# Patient Record
Sex: FEMALE | Race: WHITE
[De-identification: names, ages, dates, MRNs, and addresses within clinical notes are randomized per-mention and may not be internally consistent; named-entity substitution may affect disease eponyms.]

---

## 2018-10-23 ENCOUNTER — HOSPITAL ENCOUNTER (EMERGENCY)
Dept: HOSPITAL 74 - JERFT | Age: 53
Discharge: HOME | End: 2018-10-23
Payer: COMMERCIAL

## 2018-10-23 VITALS — DIASTOLIC BLOOD PRESSURE: 69 MMHG | TEMPERATURE: 99.8 F | HEART RATE: 81 BPM | SYSTOLIC BLOOD PRESSURE: 131 MMHG

## 2018-10-23 VITALS — BODY MASS INDEX: 41.1 KG/M2
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
## 2018-10-23 DIAGNOSIS — S92.351A: Primary | ICD-10-CM

## 2018-10-23 DIAGNOSIS — S93.491A: ICD-10-CM

## 2018-10-23 NOTE — PDOC
History of Present Illness





- General


Chief Complaint: Injury


Stated Complaint: INJURY,RT ANKLE


Time Seen by Provider: 10/23/18 14:14





- History of Present Illness


Initial Comments: 





10/23/18 14:22


53-year-old female with a past medical history significant for ADHD, she takes 

Wellbutrin and Prozac presents for evaluation of right ankle and foot pain 

after a fall at work earlier today while coming down some steps. She denies 

hitting her head. She describes an inversion type of injury to her right ankle 

and foot. No postinjury nausea vomiting or visual changes no other associated 

symptoms besides right ankle and foot pain.





Past History





- Past Medical History


Allergies/Adverse Reactions: 


 Allergies











Allergy/AdvReac Type Severity Reaction Status Date / Time


 


Penicillins Allergy   Verified 10/23/18 13:52











Home Medications: 


Ambulatory Orders





Acetaminophen W/ Codeine #3 [Tylenol # 3] 1 combo PO Q8H PRN #1 tablet 07/20/12 


Bupropion HCl [Wellbutrin -] 150 mg PO DAILY #1 tablet 07/20/12 


Fluoxetine [Fluoxetine HCl] 80 mg PO HS #1 powder 07/20/12 


Levothyroxine [Synthroid -] 125 mcg PO DAILY #1 tablet 07/20/12 








Anemia: No


Asthma: No


Cancer: No


Cardiac Disorders: No


CVA: No


COPD: No


CHF: No


Dementia: No


Diabetes: No


GI Disorders: No


 Disorders: No


HTN: No


Hypercholesterolemia: No


Liver Disease: No


Seizures: No


Thyroid Disease: Yes (HYPOTHYROIDISM)





- Surgical History


Abdominal Surgery: Yes (FIBROID REMOVAL)


Appendectomy: No


Cardiac Surgery: No


Cholecystectomy: No


Lung Surgery: No


Neurologic Surgery: No


Orthopedic Surgery: No





- Suicide/Smoking/Psychosocial Hx


Smoking History: Never smoked


Have you smoked in the past 12 months: No


Information on smoking cessation initiated: No


Hx Alcohol Use: No


Drug/Substance Use Hx: No


Substance Use Type: None


Hx Substance Use Treatment: No





**Review of Systems





- Review of Systems


Musculoskeletal: Yes: See HPI, Joint Pain


All Other Systems: Reviewed and Negative





*Physical Exam





- Vital Signs


 Last Vital Signs











Temp Pulse Resp BP Pulse Ox


 


 99.8 F H  81   16   131/69   100 


 


 10/23/18 13:50  10/23/18 13:50  10/23/18 13:50  10/23/18 13:50  10/23/18 13:50














- Physical Exam


Comments: 





10/23/18 14:28


Right ankle skin color and temperature are normal there is swelling about the 

lateral aspect of the ankle. Right foot is ecchymotic about the dorsal lateral 

aspect of the foot with swelling. There is no tenderness about the knee and 

proximal fibula or along its distal course. There is mild tenderness about the 

lateral malleolus and area of the ATFL there is tenderness about the base of 

the fifth metatarsal. No tenderness about the medial malleolus deltoid or 

navicular. Range of motion is limited she is unable to tolerate stability 

testing, there are no gross sensorimotor deficits buying calf are soft and 

nontender. He is neurovascularly intact





ED Treatment Course





- RADIOLOGY


Radiology Studies Ordered: 














 Category Date Time Status


 


 ANKLE & FOOT-RIGHT* [RAD] Stat Radiology  10/23/18 14:17 Ordered














Medical Decision Making





- Medical Decision Making





10/23/18 14:44


There is an old fracture at the shaft of the fifth metatarsal and an acute 

fracture at the styloid of the fifth metatarsal Thorpe wrap weight-bear as 

tolerated with use of crutches and hard sole shoe follow-up with orthopedics 

for the fracture and ankle sprain





*DC/Admit/Observation/Transfer


Diagnosis at time of Disposition: 


 Fracture, foot, Ankle sprain








- Discharge Dispostion


Disposition: HOME


Condition at time of disposition: Stable


Decision to Admit order: No





- Referrals


Referrals: 


Aniyah Amado MD [Primary Care Provider] - 


Charli Casas MD [Staff Physician] - 





- Patient Instructions


Printed Discharge Instructions:  Foot Fracture, DI for Foot Fracture, Ankle 

Sprain, DI for Ankle Sprain


Additional Instructions: 


He may weight-bear as tolerated with use of crutches and the hard sole shoe. 

Please Lorna Maurilio wrap in place until seen by orthopedic surgery return to the 

emergency room should symptoms worsen or go unresolved and follow-up with 

orthopedic surgery in 2-3 days for further evaluation and treatment options. He 

may take Tylenol and Motrin as directed for pain.





- Post Discharge Activity

## 2019-09-14 ENCOUNTER — HOSPITAL ENCOUNTER (EMERGENCY)
Dept: HOSPITAL 74 - JERFT | Age: 54
Discharge: HOME | End: 2019-09-14
Payer: COMMERCIAL

## 2019-09-14 VITALS — SYSTOLIC BLOOD PRESSURE: 148 MMHG | HEART RATE: 77 BPM | DIASTOLIC BLOOD PRESSURE: 75 MMHG | TEMPERATURE: 99 F

## 2019-09-14 VITALS — BODY MASS INDEX: 41.1 KG/M2

## 2019-09-14 DIAGNOSIS — H60.502: Primary | ICD-10-CM

## 2019-09-14 DIAGNOSIS — F90.9: ICD-10-CM

## 2019-09-14 DIAGNOSIS — F32.9: ICD-10-CM

## 2019-09-14 DIAGNOSIS — H66.92: ICD-10-CM

## 2019-09-14 DIAGNOSIS — E03.9: ICD-10-CM

## 2019-09-14 DIAGNOSIS — M16.0: ICD-10-CM

## 2019-09-14 NOTE — PDOC
History of Present Illness





- General


Chief Complaint: Ear Problem


Stated Complaint: HEARING LOSS TO LT EAR


Time Seen by Provider: 09/14/19 19:20


History Source: Patient


Exam Limitations: No Limitations





- History of Present Illness


Initial Comments: 





09/14/19 19:43





HISTORY OF PRESENT ILLNESS: This is a 54-year-old woman who presents emergency 

department for evaluation of left ear pain and decreased hearing over the past 

3 days. Patient reports when she lays with her left ear down the pain in clog 

sensation she is experiencing in the ear temporarily resolves and her hearing 

returned to normal. Patient noted when she woke up this morning there is some 

dried crusted mucus on her face which had originated from her left ear. She 

denies any earbud or headphone usage. Patient denies any recent swimming in the 

past 2 weeks.





No recent travel or sick contacts. 


PAST MEDICAL HISTORY: Denies past medical history


SURGICAL HISTORY: Denies


ALLERGIES: PCN





REVIEW OF SYSTEMS


General/Constitutional: Denies fever or chills. Denies weakness, weight change.


HEENT: see HPI


Cardiovascular: Denies chest pain or shortness of breath.


Respiratory: Denies cough, wheezing, or hemoptysis.


Gastrointestinal: Denies nausea, vomiting, diarrhea or constipation. Denies 

rectal bleeding.


Genitourinary: Denies dysuria, frequency, or change in urination.


Musculoskeletal: Denies joint or muscle swelling or pain. Denies neck or back 

pain.


Skin and breasts: Denies rash or easy bruising.


Neurologic: Denies headache, vertigo, loss of consciousness, or loss of 

sensation.


Psychiatric: Denies depression or anxiety.


Endocrine: Denies increased thirst. Denies abnormal weight change.


Hematologic/Lymphatic: Denies anemia, easy bleeding, or history of blood clots.


Allergic/Immunologic: Denies hives or skin allergy. Denies latex allergy.











PHYSICAL EXAM


General Appearance: Well-appearing, appropriately dressed.  No apparent distress

, no intoxication.


HEENT: EOMI, PERRLA, normal ENT inspection, normal voice, TMs normal, pharynx 

normal.  No conjunctival pallor.  No photophobia, scleral icterus. Left EAC 

with purulent drainage present. Inflammation present and unable to visualize 

left TM due to swelling. Left tragal tenderness.


Respiratory/Chest: Lungs CTAB.  No shortness of breath, chest tenderness, 

respiratory distress, accessory muscle use. No crackles, rales, rhonchi, stridor

, wheezing, dullness


Cardiovascular: RRR. S1, S2.  No JVD, murmur, bradycardia, tachycardia.


Neurologic: CNs II-XII intact. Fully oriented, alert.  Appropriate mood/affect. 

Motor strength 5/5.  No appreciable EOM palsy, facial droop or sensory deficit.





Past History





- Past Medical History


Allergies/Adverse Reactions: 


 Allergies











Allergy/AdvReac Type Severity Reaction Status Date / Time


 


Penicillins Allergy   Verified 09/14/19 19:14











Home Medications: 


Ambulatory Orders





Acetaminophen W/ Codeine #3 [Tylenol # 3] 1 combo PO Q8H PRN #1 tablet 07/20/12 


Bupropion HCl [Wellbutrin -] 150 mg PO DAILY #1 tablet 07/20/12 


Fluoxetine [Fluoxetine HCl] 80 mg PO HS #1 powder 07/20/12 


Levothyroxine [Synthroid -] 125 mcg PO DAILY #1 tablet 07/20/12 


Erythromycin 0.5% Eye Ointment [Erythromycin 0.5% Eye Ointment -] 1 applic TP 

DAILY #1 tube 01/28/19 


Azithromycin [Zithromax 250mg Tablets -] 250 mg PO UTDICT #6 tab 09/14/19 


Ciprofloxacin HCl/Dexameth [Ciprodex Otic Suspension] 4 drop AS BID #1 bottle 09 /14/19 








Anemia: No


Asthma: No


Cancer: No


Cardiac Disorders: No


CVA: No


COPD: No


CHF: No


Dementia: No


Diabetes: No


GI Disorders: No


 Disorders: No


HTN: No


Hypercholesterolemia: No


Liver Disease: No


Psychiatric Problems: Yes (ADHD depression)


Seizures: No


Thyroid Disease: Yes (HYPOTHYROIDISM)


Other medical history: osteoarthritis to hips





- Surgical History


Abdominal Surgery: Yes (FIBROID REMOVAL)


Appendectomy: No


Cardiac Surgery: No


Cholecystectomy: No


Lung Surgery: No


Neurologic Surgery: No


Orthopedic Surgery: No





- Suicide/Smoking/Psychosocial Hx


Smoking History: Never smoked


Have you smoked in the past 12 months: No


Hx Alcohol Use: No


Drug/Substance Use Hx: No


Substance Use Type: None


Hx Substance Use Treatment: No





*Physical Exam





- Vital Signs


 Last Vital Signs











Temp Pulse Resp BP Pulse Ox


 


 99.0 F   77   18   148/75   96 


 


 09/14/19 19:11  09/14/19 19:11  09/14/19 19:11  09/14/19 19:11  09/14/19 19:11














Medical Decision Making





- Medical Decision Making





09/14/19 19:41


A/P:


54-year-old woman with acute otitis externa of the left ear





Left tragal tenderness noted


No mastoid tenderness present bilaterally


Unable to visualize left TM due to excessive swelling in the external auditory 

canal. I will treat patient for otitis media as well should this be a 

suppuritive otitis media.





Discharge home





*DC/Admit/Observation/Transfer


Diagnosis at time of Disposition: 


Otitis externa


Qualifiers:


 Otitis externa type: unspecified type Chronicity: acute Laterality: left 

Qualified Code(s): H60.502 - Unspecified acute noninfective otitis externa, 

left ear








- Discharge Dispostion


Disposition: HOME


Condition at time of disposition: Stable


Decision to Admit order: No





- Prescriptions


Prescriptions: 


Azithromycin [Zithromax 250mg Tablets -] 250 mg PO UTDICT #6 tab


Ciprofloxacin HCl/Dexameth [Ciprodex Otic Suspension] 4 drop AS BID #1 bottle





- Referrals


Referrals: 


Quinn Victoria MD [Staff Physician] - 





- Patient Instructions


Additional Instructions: 


Rest, lots of fluids; water, teas, soups





Hot wet soaks to ear/hot packs may help relieve some pain


May use over-the-counter anesthetic drops to ears to help relieve some pain


Avoid getting water in ear, may use alcohol drops to help dry up any water 

retained in ears


Use earplugs when swimming to avoid any water retention





Continue ibuprofen or Tylenol for pain  and fevers


Ciprodex otic solution 3 drops 2 times a day for 7 days


Azithromycin as directed.





Followup with private physician / ENT doctor in 2-3 days 


Return to emergency department or see private physician immediately for swelling

, redness, from ears, or fevers, 





- Post Discharge Activity

## 2021-03-04 ENCOUNTER — HOSPITAL ENCOUNTER (EMERGENCY)
Dept: HOSPITAL 74 - JCOVINFU | Age: 56
Discharge: HOME | End: 2021-03-04
Payer: COMMERCIAL

## 2021-03-04 VITALS — BODY MASS INDEX: 34 KG/M2

## 2021-03-04 VITALS — TEMPERATURE: 98.8 F | HEART RATE: 76 BPM | DIASTOLIC BLOOD PRESSURE: 91 MMHG | SYSTOLIC BLOOD PRESSURE: 151 MMHG

## 2021-03-04 DIAGNOSIS — U07.1: Primary | ICD-10-CM

## 2021-03-04 LAB
ALBUMIN SERPL-MCNC: 4.1 G/DL (ref 3.4–5)
ALP SERPL-CCNC: 82 U/L (ref 45–117)
ALT SERPL-CCNC: 35 U/L (ref 13–61)
ANION GAP SERPL CALC-SCNC: 9 MMOL/L (ref 8–16)
AST SERPL-CCNC: 23 U/L (ref 15–37)
BASOPHILS # BLD: 0.8 % (ref 0–2)
BILIRUB SERPL-MCNC: 0.3 MG/DL (ref 0.2–1)
BUN SERPL-MCNC: 12.8 MG/DL (ref 7–18)
CALCIUM SERPL-MCNC: 9.4 MG/DL (ref 8.5–10.1)
CHLORIDE SERPL-SCNC: 102 MMOL/L (ref 98–107)
CO2 SERPL-SCNC: 26 MMOL/L (ref 21–32)
CREAT SERPL-MCNC: 0.8 MG/DL (ref 0.55–1.3)
DEPRECATED RDW RBC AUTO: 12.8 % (ref 11.6–15.6)
EOSINOPHIL # BLD: 1.3 % (ref 0–4.5)
GLUCOSE SERPL-MCNC: 87 MG/DL (ref 74–106)
HCT VFR BLD CALC: 44.6 % (ref 32.4–45.2)
HGB BLD-MCNC: 15.4 GM/DL (ref 10.7–15.3)
LYMPHOCYTES # BLD: 30.1 % (ref 8–40)
MCH RBC QN AUTO: 31 PG (ref 25.7–33.7)
MCHC RBC AUTO-ENTMCNC: 34.6 G/DL (ref 32–36)
MCV RBC: 89.8 FL (ref 80–96)
MONOCYTES # BLD AUTO: 12.6 % (ref 3.8–10.2)
NEUTROPHILS # BLD: 55.2 % (ref 42.8–82.8)
PLATELET # BLD AUTO: 340 K/MM3 (ref 134–434)
PMV BLD: 7.7 FL (ref 7.5–11.1)
POTASSIUM SERPLBLD-SCNC: 3.9 MMOL/L (ref 3.5–5.1)
PROT SERPL-MCNC: 7.6 G/DL (ref 6.4–8.2)
RBC # BLD AUTO: 4.96 M/MM3 (ref 3.6–5.2)
SODIUM SERPL-SCNC: 136 MMOL/L (ref 136–145)
WBC # BLD AUTO: 4.2 K/MM3 (ref 4–10)

## 2021-03-04 PROCEDURE — M0239 BAMLANIVIMAB-XXXX INFUSION: HCPCS

## 2021-04-16 PROBLEM — Z00.00 ENCOUNTER FOR PREVENTIVE HEALTH EXAMINATION: Status: ACTIVE | Noted: 2021-04-16

## 2021-04-23 ENCOUNTER — APPOINTMENT (OUTPATIENT)
Dept: BARIATRICS | Facility: CLINIC | Age: 56
End: 2021-04-23
Payer: COMMERCIAL

## 2021-04-23 VITALS — BODY MASS INDEX: 42.68 KG/M2 | WEIGHT: 250 LBS | HEIGHT: 64 IN

## 2021-04-23 DIAGNOSIS — I10 ESSENTIAL (PRIMARY) HYPERTENSION: ICD-10-CM

## 2021-04-23 DIAGNOSIS — F41.9 ANXIETY DISORDER, UNSPECIFIED: ICD-10-CM

## 2021-04-23 DIAGNOSIS — E06.3 AUTOIMMUNE THYROIDITIS: ICD-10-CM

## 2021-04-23 DIAGNOSIS — F32.9 ANXIETY DISORDER, UNSPECIFIED: ICD-10-CM

## 2021-04-23 PROCEDURE — 99205 OFFICE O/P NEW HI 60 MIN: CPT | Mod: 95

## 2021-04-23 RX ORDER — FLUOXETINE HCL 20 MG/5 ML
SOLUTION, ORAL ORAL
Refills: 0 | Status: ACTIVE | COMMUNITY

## 2021-04-23 RX ORDER — BUPROPION HYDROCHLORIDE 100 MG/1
TABLET, FILM COATED ORAL
Refills: 0 | Status: ACTIVE | COMMUNITY

## 2021-04-23 NOTE — HISTORY OF PRESENT ILLNESS
[de-identified] : Olga Ratliff is a 56 year old female who presents in consult via visual telemedicine for cc of refractive morbid obesity, HTN, depression, and ADHD 250lbs and 5'4" BMI of 43. 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 She has increased joint pain and instability. She take Synthroid. She has tried medical weight loss programs which she was unable to have lasting  long term impact. She has tried a special thyroid diet losing weight then immediately gaining it back. She is relatively inactive and understands the need to move forward. She has seen previous bariatric surgeons . I stressed the importance of living an active lifestyle in order to get weight off and maintain weight loss for her entire life. Suggested working with a  who understands bariatric surgery to help her get active. Risks, benefits and expectations discussed in depth.

## 2021-04-23 NOTE — REASON FOR VISIT
[Home] : at home, [unfilled] , at the time of the visit. [Medical Office: ([facility])___] : at the medical office located in  [Other:____] : [unfilled] [Verbal consent obtained from patient] : the patient, [unfilled] [Morbid Obesity (BMI>40)] : morbid obesity (bmi>40) [Initial Consult] : an initial consult for

## 2021-04-23 NOTE — PLAN
[FreeTextEntry1] : Will get Blood work, EGD, UGI, psychiatric clearance, and meet with dietician. She must increase activity levels, getting in contact with  prior to operation.

## 2021-04-23 NOTE — ASSESSMENT
[FreeTextEntry1] : 55 y/o female with Hashimoto thyroiditis, HTN, and anxiety and depression who has gone through medical weight loss and medically guided diets with no ability to maintain weight loss. Minimal reflux symptoms. With All questions asked and answered.

## 2021-05-11 ENCOUNTER — APPOINTMENT (OUTPATIENT)
Dept: BARIATRICS | Facility: CLINIC | Age: 56
End: 2021-05-11
Payer: COMMERCIAL

## 2021-05-11 PROCEDURE — 97802 MEDICAL NUTRITION INDIV IN: CPT | Mod: 95

## 2021-06-01 ENCOUNTER — APPOINTMENT (OUTPATIENT)
Dept: BARIATRICS | Facility: CLINIC | Age: 56
End: 2021-06-01
Payer: SELF-PAY

## 2021-06-01 PROCEDURE — 90791 PSYCH DIAGNOSTIC EVALUATION: CPT

## 2021-07-13 ENCOUNTER — APPOINTMENT (OUTPATIENT)
Dept: BARIATRICS | Facility: CLINIC | Age: 56
End: 2021-07-13
Payer: COMMERCIAL

## 2021-07-13 PROCEDURE — 97803 MED NUTRITION INDIV SUBSEQ: CPT | Mod: 95

## 2021-08-06 ENCOUNTER — APPOINTMENT (OUTPATIENT)
Dept: BARIATRICS | Facility: CLINIC | Age: 56
End: 2021-08-06
Payer: COMMERCIAL

## 2021-08-06 VITALS — BODY MASS INDEX: 43.5 KG/M2 | WEIGHT: 253.4 LBS

## 2021-08-06 PROCEDURE — 99213 OFFICE O/P EST LOW 20 MIN: CPT | Mod: 95

## 2021-08-06 NOTE — REASON FOR VISIT
[Home] : at home, [unfilled] , at the time of the visit. [place] [place] : at the medical office located in  [Other:____] : [unfilled] [Verbal consent obtained from patient] : the patient, [unfilled] [Follow-Up Visit] : a follow-up visit for [Morbid Obesity (BMI>40)] : morbid obesity (bmi>40)

## 2021-08-06 NOTE — HISTORY OF PRESENT ILLNESS
[de-identified] : Olga Ratliff is a 56 year old female who presents in consult via visual telemedicine for cc of refractive morbid obesity, HTN, depression, and ADHD 250lbs and 5'4" BMI of 43. Pt is scheduled for MOD DS DOS: 8-19-21. She has been working with Flavia () and is doing well. Stressed the importance of avoiding processed foods pre and post operation. Risks, benefits and expectations discussed with the patient. In addition I discussed the importance of following vitamin regime and getting enough protein in her diet. All pre and post operative protocol discussed with the patient. All questions asked and answered. \par \par

## 2021-08-06 NOTE — PLAN
[FreeTextEntry1] : Will proceed with MOD DS 8/19/2021. Will continue to see dietician and Flavia () following the operation.

## 2021-08-18 ENCOUNTER — NON-APPOINTMENT (OUTPATIENT)
Age: 56
End: 2021-08-18

## 2021-08-19 ENCOUNTER — APPOINTMENT (OUTPATIENT)
Dept: BARIATRICS | Facility: HOSPITAL | Age: 56
End: 2021-08-19

## 2021-08-23 ENCOUNTER — NON-APPOINTMENT (OUTPATIENT)
Age: 56
End: 2021-08-23

## 2021-08-27 ENCOUNTER — APPOINTMENT (OUTPATIENT)
Dept: BARIATRICS | Facility: CLINIC | Age: 56
End: 2021-08-27
Payer: COMMERCIAL

## 2021-08-27 PROCEDURE — 97803 MED NUTRITION INDIV SUBSEQ: CPT | Mod: 95

## 2021-09-03 ENCOUNTER — APPOINTMENT (OUTPATIENT)
Dept: BARIATRICS | Facility: CLINIC | Age: 56
End: 2021-09-03
Payer: COMMERCIAL

## 2021-09-03 VITALS — WEIGHT: 243 LBS | BODY MASS INDEX: 41.71 KG/M2

## 2021-09-03 DIAGNOSIS — E66.01 MORBID (SEVERE) OBESITY DUE TO EXCESS CALORIES: ICD-10-CM

## 2021-09-03 DIAGNOSIS — Z98.84 BARIATRIC SURGERY STATUS: ICD-10-CM

## 2021-09-03 PROCEDURE — 99024 POSTOP FOLLOW-UP VISIT: CPT

## 2021-09-03 NOTE — HISTORY OF PRESENT ILLNESS
[de-identified] : s/p lap mds doing well\par moves bowels daily\par has some eccymosis reassured\par

## 2021-09-21 ENCOUNTER — APPOINTMENT (OUTPATIENT)
Dept: BARIATRICS | Facility: CLINIC | Age: 56
End: 2021-09-21
Payer: COMMERCIAL

## 2021-09-21 PROCEDURE — 97803 MED NUTRITION INDIV SUBSEQ: CPT | Mod: 95

## 2021-11-08 ENCOUNTER — APPOINTMENT (OUTPATIENT)
Dept: BARIATRICS | Facility: CLINIC | Age: 56
End: 2021-11-08
Payer: COMMERCIAL

## 2021-11-08 PROCEDURE — 97803 MED NUTRITION INDIV SUBSEQ: CPT | Mod: 95

## 2022-01-24 ENCOUNTER — APPOINTMENT (OUTPATIENT)
Dept: BARIATRICS | Facility: CLINIC | Age: 57
End: 2022-01-24
Payer: COMMERCIAL

## 2022-01-24 PROCEDURE — 97803 MED NUTRITION INDIV SUBSEQ: CPT | Mod: 95

## 2022-09-06 ENCOUNTER — NON-APPOINTMENT (OUTPATIENT)
Age: 57
End: 2022-09-06

## 2022-09-21 ENCOUNTER — NON-APPOINTMENT (OUTPATIENT)
Age: 57
End: 2022-09-21

## 2024-09-26 ENCOUNTER — HOSPITAL ENCOUNTER (EMERGENCY)
Dept: HOSPITAL 74 - JER | Age: 59
Discharge: HOME | End: 2024-09-26
Payer: COMMERCIAL

## 2024-09-26 VITALS
DIASTOLIC BLOOD PRESSURE: 91 MMHG | TEMPERATURE: 98.8 F | RESPIRATION RATE: 16 BRPM | HEART RATE: 65 BPM | SYSTOLIC BLOOD PRESSURE: 171 MMHG

## 2024-09-26 VITALS — BODY MASS INDEX: 29.2 KG/M2

## 2024-09-26 DIAGNOSIS — S52.502A: Primary | ICD-10-CM
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
## 2024-09-26 PROCEDURE — 2W3DX1Z IMMOBILIZATION OF LEFT LOWER ARM USING SPLINT: ICD-10-PCS

## 2024-09-26 RX ADMIN — LIDOCAINE HYDROCHLORIDE ONE ML: 10 INJECTION, SOLUTION INFILTRATION; PERINEURAL at 20:22

## 2024-09-26 RX ADMIN — ACETAMINOPHEN ONE MG: 500 TABLET, FILM COATED ORAL at 18:49
